# Patient Record
Sex: FEMALE | Race: WHITE
[De-identification: names, ages, dates, MRNs, and addresses within clinical notes are randomized per-mention and may not be internally consistent; named-entity substitution may affect disease eponyms.]

---

## 2018-09-20 ENCOUNTER — HOSPITAL ENCOUNTER (EMERGENCY)
Dept: HOSPITAL 47 - EC | Age: 23
Discharge: HOME | End: 2018-09-20
Payer: COMMERCIAL

## 2018-09-20 VITALS
RESPIRATION RATE: 18 BRPM | TEMPERATURE: 98.5 F | SYSTOLIC BLOOD PRESSURE: 109 MMHG | HEART RATE: 89 BPM | DIASTOLIC BLOOD PRESSURE: 72 MMHG
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
## 2018-09-20 DIAGNOSIS — S80.01XA: Primary | ICD-10-CM
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
## 2018-09-20 DIAGNOSIS — Z23: ICD-10-CM

## 2018-09-20 PROCEDURE — 90471 IMMUNIZATION ADMIN: CPT

## 2018-09-20 PROCEDURE — 99283 EMERGENCY DEPT VISIT LOW MDM: CPT

## 2018-09-20 PROCEDURE — 90715 TDAP VACCINE 7 YRS/> IM: CPT
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
## 2019-10-16 ENCOUNTER — HOSPITAL ENCOUNTER (INPATIENT)
Dept: HOSPITAL 47 - 4FBP | Age: 24
LOS: 3 days | Discharge: HOME | End: 2019-10-19
Attending: OBSTETRICS & GYNECOLOGY | Admitting: OBSTETRICS & GYNECOLOGY
Payer: COMMERCIAL

## 2019-10-16 VITALS — BODY MASS INDEX: 23.3 KG/M2

## 2019-10-16 DIAGNOSIS — Z3A.40: ICD-10-CM

## 2019-10-16 DIAGNOSIS — O36.5930: Primary | ICD-10-CM

## 2019-10-16 PROCEDURE — 85025 COMPLETE CBC W/AUTO DIFF WBC: CPT

## 2019-10-16 PROCEDURE — 86901 BLOOD TYPING SEROLOGIC RH(D): CPT

## 2019-10-16 PROCEDURE — 86850 RBC ANTIBODY SCREEN: CPT

## 2019-10-16 PROCEDURE — 86900 BLOOD TYPING SEROLOGIC ABO: CPT

## 2019-10-16 PROCEDURE — 88307 TISSUE EXAM BY PATHOLOGIST: CPT

## 2019-10-17 LAB
BASOPHILS # BLD AUTO: 0.1 K/UL (ref 0–0.2)
BASOPHILS NFR BLD AUTO: 0 %
EOSINOPHIL # BLD AUTO: 0.2 K/UL (ref 0–0.7)
EOSINOPHIL NFR BLD AUTO: 2 %
ERYTHROCYTE [DISTWIDTH] IN BLOOD BY AUTOMATED COUNT: 3.55 M/UL (ref 3.8–5.4)
ERYTHROCYTE [DISTWIDTH] IN BLOOD: 12.5 % (ref 11.5–15.5)
HCT VFR BLD AUTO: 37.5 % (ref 34–46)
HGB BLD-MCNC: 12.6 GM/DL (ref 11.4–16)
LYMPHOCYTES # SPEC AUTO: 1.9 K/UL (ref 1–4.8)
LYMPHOCYTES NFR SPEC AUTO: 15 %
MCH RBC QN AUTO: 35.6 PG (ref 25–35)
MCHC RBC AUTO-ENTMCNC: 33.7 G/DL (ref 31–37)
MCV RBC AUTO: 105.6 FL (ref 80–100)
MONOCYTES # BLD AUTO: 0.5 K/UL (ref 0–1)
MONOCYTES NFR BLD AUTO: 4 %
NEUTROPHILS # BLD AUTO: 9.5 K/UL (ref 1.3–7.7)
NEUTROPHILS NFR BLD AUTO: 77 %
PLATELET # BLD AUTO: 306 K/UL (ref 150–450)
WBC # BLD AUTO: 12.3 K/UL (ref 3.8–10.6)

## 2019-10-17 PROCEDURE — 3E0P7VZ INTRODUCTION OF HORMONE INTO FEMALE REPRODUCTIVE, VIA NATURAL OR ARTIFICIAL OPENING: ICD-10-PCS

## 2019-10-17 PROCEDURE — 00HU33Z INSERTION OF INFUSION DEVICE INTO SPINAL CANAL, PERCUTANEOUS APPROACH: ICD-10-PCS

## 2019-10-17 PROCEDURE — 3E0R3NZ INTRODUCTION OF ANALGESICS, HYPNOTICS, SEDATIVES INTO SPINAL CANAL, PERCUTANEOUS APPROACH: ICD-10-PCS

## 2019-10-17 PROCEDURE — 10907ZC DRAINAGE OF AMNIOTIC FLUID, THERAPEUTIC FROM PRODUCTS OF CONCEPTION, VIA NATURAL OR ARTIFICIAL OPENING: ICD-10-PCS

## 2019-10-17 RX ADMIN — POTASSIUM CHLORIDE SCH: 14.9 INJECTION, SOLUTION INTRAVENOUS at 14:59
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
## 2019-10-18 PROCEDURE — 0KQM0ZZ REPAIR PERINEUM MUSCLE, OPEN APPROACH: ICD-10-PCS

## 2019-10-18 RX ADMIN — IBUPROFEN PRN MG: 600 TABLET ORAL at 09:33

## 2019-10-18 RX ADMIN — IBUPROFEN PRN MG: 600 TABLET ORAL at 15:16

## 2019-10-18 RX ADMIN — DOCUSATE SODIUM AND SENNOSIDES SCH: 50; 8.6 TABLET ORAL at 21:06

## 2019-10-18 RX ADMIN — DOCUSATE SODIUM AND SENNOSIDES SCH EACH: 50; 8.6 TABLET ORAL at 21:04

## 2019-10-18 RX ADMIN — POTASSIUM CHLORIDE SCH MLS/HR: 14.9 INJECTION, SOLUTION INTRAVENOUS at 03:16

## 2019-10-18 RX ADMIN — POTASSIUM CHLORIDE SCH MLS/HR: 14.9 INJECTION, SOLUTION INTRAVENOUS at 02:00

## 2019-10-18 RX ADMIN — IBUPROFEN PRN MG: 600 TABLET ORAL at 21:06

## 2019-10-18 RX ADMIN — DOCUSATE SODIUM AND SENNOSIDES SCH EACH: 50; 8.6 TABLET ORAL at 21:06

## 2019-10-18 NOTE — P.PROBDLV
Vaginal Delivery Note





- .


Vaginal Delivery Note: 





This is a pleasant 23-year-old  1 para 0 that presented to labor and 

delivery for Cervidil induction of labor at 40 1/7 weeks.  Cervidil was placed 

without difficulty about 1300 this afternoon.  Patient progressed through labor 

rohini regularly, eventually becoming uncomfortable and requesting epidural

placement.  Epidural was placed without difficulty by the anesthesia department.

 Patient had noted spontaneous rupture of membranes and thin meconium was noted.

 Patient progressed to complete began pushing and had a normal spontaneous 

vaginal delivery of a viable female infant at 505, weight of 6 pounds 1.5 

ounces, Apgars of 9 and 9 at one and 5 minutes respectively.  The umbilical cord

was then doubly clamped and cut after a two-minute delayed and the infant was 

handed off to mom.  The placenta was then delivered spontaneously intact with a 

three-vessel cord being noted.  On inspection the patient's vaginal vault a 

second-degree midline laceration was noted this was repaired in the usual 

fashion with 3-0 Rapide.  The uterus was noted to be firm and below the 

umbilicus at this time.  Estimated blood loss proximally 200 mL.


The vaginal laceration was inspected after delivery of the placenta hemostasis 

was appreciated no further lacerations were noted.





All counts were correct 2, patient and infant tolerated delivery well and are 

resting comfortably.

## 2019-10-19 VITALS — DIASTOLIC BLOOD PRESSURE: 69 MMHG | RESPIRATION RATE: 18 BRPM | HEART RATE: 87 BPM | SYSTOLIC BLOOD PRESSURE: 110 MMHG

## 2019-10-19 VITALS — TEMPERATURE: 98.4 F

## 2019-10-19 LAB
BASOPHILS # BLD AUTO: 0 K/UL (ref 0–0.2)
BASOPHILS NFR BLD AUTO: 0 %
EOSINOPHIL # BLD AUTO: 0.2 K/UL (ref 0–0.7)
EOSINOPHIL NFR BLD AUTO: 1 %
ERYTHROCYTE [DISTWIDTH] IN BLOOD BY AUTOMATED COUNT: 2.98 M/UL (ref 3.8–5.4)
ERYTHROCYTE [DISTWIDTH] IN BLOOD: 12.6 % (ref 11.5–15.5)
HCT VFR BLD AUTO: 32 % (ref 34–46)
HGB BLD-MCNC: 10.7 GM/DL (ref 11.4–16)
LYMPHOCYTES # SPEC AUTO: 2.1 K/UL (ref 1–4.8)
LYMPHOCYTES NFR SPEC AUTO: 15 %
MCH RBC QN AUTO: 36 PG (ref 25–35)
MCHC RBC AUTO-ENTMCNC: 33.5 G/DL (ref 31–37)
MCV RBC AUTO: 107.4 FL (ref 80–100)
MONOCYTES # BLD AUTO: 0.5 K/UL (ref 0–1)
MONOCYTES NFR BLD AUTO: 4 %
NEUTROPHILS # BLD AUTO: 10.5 K/UL (ref 1.3–7.7)
NEUTROPHILS NFR BLD AUTO: 78 %
PLATELET # BLD AUTO: 244 K/UL (ref 150–450)
WBC # BLD AUTO: 13.5 K/UL (ref 3.8–10.6)

## 2019-10-19 RX ADMIN — IBUPROFEN PRN MG: 600 TABLET ORAL at 06:10

## 2019-10-19 RX ADMIN — DOCUSATE SODIUM AND SENNOSIDES SCH EACH: 50; 8.6 TABLET ORAL at 08:06

## 2019-10-19 NOTE — P.DS
Providers


Date of admission: 


10/17/19 12:06





Expected date of discharge: 10/19/19


Attending physician: 


Saundra Copeland





Primary care physician: 


JAYA Kinney








- Discharge Diagnosis(es)


(1) Term pregnancy


Current Visit: Yes   Status: Acute   





(2) IUGR (intrauterine growth restriction)


Current Visit: Yes   Status: Acute   





(3) Status post vaginal delivery


Current Visit: Yes   Status: Acute   





(4) Perineal laceration during delivery


Current Visit: Yes   Status: Acute   


Hospital Course: 





This is a pleasant 23-year-old  1 para 0 that presented to labor and 

delivery on 10/17 for induction of labor.  Patient was noted to be 40-1/7 weeks.

 Patient was admitted and Cervidil induction of labor was begun.


Patient progressed through labor became uncomfortable and requested epidural 

placement.  Epidural was placed without difficulty by the anesthesia department.

 Patient noted spontaneous rupture of membranes soon afterwards and thin 

meconium was noted.  Patient progressed to complete began pushing and had a 

normal spontaneous vaginal delivery of a viable female infant at 505, weight of 

6 pounds 1.5 ounces and Apgars of 9 and 9 at one and 5 minutes respectively.  

Patient did sustain a second degree midline laceration which was repaired in the

usual fashion with 3-0 Rapide.  Patient's postpartum course has been uneventful.

 On this postpartum day #1 she is ambulating and voiding without difficulty.  

She is tolerating a regular diet without nausea or vomiting.  She denies 

concerns and wishes discharge home.


Patient Condition at Discharge: Good





Plan - Discharge Summary


Discharge Rx Participant: No


New Discharge Prescriptions: 


No Action


   Pnv No.95/Ferrous Fum/Folic AC [Prenatal Multivitamin Tablet] 1 each PO DAILY


Discharge Medication List





Pnv No.95/Ferrous Fum/Folic AC [Prenatal Multivitamin Tablet] 1 each PO DAILY 

10/17/19 [History]








Follow up Appointment(s)/Referral(s): 


Saundra Copeland DO [Doctor of Osteopathic Medicine] - 4 Weeks


Patient Instructions/Handouts:  Vaginal Delivery (DC), Vaginal Delivery (GEN)


Discharge Disposition: HOME SELF-CARE

## 2021-09-15 ENCOUNTER — HOSPITAL ENCOUNTER (OUTPATIENT)
Dept: HOSPITAL 47 - LABWHC1 | Age: 26
Discharge: HOME | End: 2021-09-15
Attending: OBSTETRICS & GYNECOLOGY
Payer: COMMERCIAL

## 2021-09-15 DIAGNOSIS — O20.0: Primary | ICD-10-CM

## 2021-09-15 DIAGNOSIS — Z3A.00: ICD-10-CM

## 2021-09-15 PROCEDURE — 36415 COLL VENOUS BLD VENIPUNCTURE: CPT

## 2021-09-15 PROCEDURE — 84144 ASSAY OF PROGESTERONE: CPT

## 2021-09-15 PROCEDURE — 84702 CHORIONIC GONADOTROPIN TEST: CPT

## 2021-09-17 ENCOUNTER — HOSPITAL ENCOUNTER (OUTPATIENT)
Dept: HOSPITAL 47 - LABWHC1 | Age: 26
Discharge: HOME | End: 2021-09-17
Attending: OBSTETRICS & GYNECOLOGY
Payer: COMMERCIAL

## 2021-09-17 DIAGNOSIS — O20.0: Primary | ICD-10-CM

## 2021-09-17 PROCEDURE — 84702 CHORIONIC GONADOTROPIN TEST: CPT

## 2021-09-17 PROCEDURE — 36415 COLL VENOUS BLD VENIPUNCTURE: CPT

## 2023-01-27 ENCOUNTER — HOSPITAL ENCOUNTER (INPATIENT)
Dept: HOSPITAL 47 - 4FBP | Age: 28
LOS: 1 days | Discharge: HOME | End: 2023-01-28
Attending: OBSTETRICS & GYNECOLOGY | Admitting: OBSTETRICS & GYNECOLOGY
Payer: COMMERCIAL

## 2023-01-27 DIAGNOSIS — Z3A.40: ICD-10-CM

## 2023-01-27 DIAGNOSIS — O48.0: ICD-10-CM

## 2023-01-27 LAB
BASOPHILS # BLD AUTO: 0 K/UL (ref 0–0.2)
BASOPHILS NFR BLD AUTO: 0 %
EOSINOPHIL # BLD AUTO: 0.2 K/UL (ref 0–0.7)
EOSINOPHIL NFR BLD AUTO: 2 %
ERYTHROCYTE [DISTWIDTH] IN BLOOD BY AUTOMATED COUNT: 3.61 M/UL (ref 3.8–5.4)
ERYTHROCYTE [DISTWIDTH] IN BLOOD: 14 % (ref 11.5–15.5)
HCT VFR BLD AUTO: 33.5 % (ref 34–46)
HGB BLD-MCNC: 10.4 GM/DL (ref 11.4–16)
LYMPHOCYTES # SPEC AUTO: 1.8 K/UL (ref 1–4.8)
LYMPHOCYTES NFR SPEC AUTO: 17 %
MCH RBC QN AUTO: 28.9 PG (ref 25–35)
MCHC RBC AUTO-ENTMCNC: 31.2 G/DL (ref 31–37)
MCV RBC AUTO: 92.6 FL (ref 80–100)
MONOCYTES # BLD AUTO: 0.4 K/UL (ref 0–1)
MONOCYTES NFR BLD AUTO: 4 %
NEUTROPHILS # BLD AUTO: 8.2 K/UL (ref 1.3–7.7)
NEUTROPHILS NFR BLD AUTO: 76 %
PLATELET # BLD AUTO: 269 K/UL (ref 150–450)
WBC # BLD AUTO: 10.8 K/UL (ref 3.8–10.6)

## 2023-01-27 PROCEDURE — 0DQR0ZZ REPAIR ANAL SPHINCTER, OPEN APPROACH: ICD-10-PCS

## 2023-01-27 PROCEDURE — 85025 COMPLETE CBC W/AUTO DIFF WBC: CPT

## 2023-01-27 PROCEDURE — 3E033VJ INTRODUCTION OF OTHER HORMONE INTO PERIPHERAL VEIN, PERCUTANEOUS APPROACH: ICD-10-PCS

## 2023-01-27 PROCEDURE — 0KQM0ZZ REPAIR PERINEUM MUSCLE, OPEN APPROACH: ICD-10-PCS

## 2023-01-27 PROCEDURE — 86901 BLOOD TYPING SEROLOGIC RH(D): CPT

## 2023-01-27 PROCEDURE — 86850 RBC ANTIBODY SCREEN: CPT

## 2023-01-27 PROCEDURE — 86900 BLOOD TYPING SEROLOGIC ABO: CPT

## 2023-01-27 PROCEDURE — 4A0HXCZ MEASUREMENT OF PRODUCTS OF CONCEPTION, CARDIAC RATE, EXTERNAL APPROACH: ICD-10-PCS

## 2023-01-27 PROCEDURE — 10907ZC DRAINAGE OF AMNIOTIC FLUID, THERAPEUTIC FROM PRODUCTS OF CONCEPTION, VIA NATURAL OR ARTIFICIAL OPENING: ICD-10-PCS

## 2023-01-27 RX ADMIN — DOCUSATE SODIUM AND SENNOSIDES SCH EACH: 50; 8.6 TABLET ORAL at 23:35

## 2023-01-27 RX ADMIN — IBUPROFEN PRN MG: 600 TABLET ORAL at 19:41

## 2023-01-27 RX ADMIN — POTASSIUM CHLORIDE SCH MLS/HR: 14.9 INJECTION, SOLUTION INTRAVENOUS at 06:38

## 2023-01-27 RX ADMIN — POTASSIUM CHLORIDE SCH MLS/HR: 14.9 INJECTION, SOLUTION INTRAVENOUS at 11:58

## 2023-01-27 RX ADMIN — Medication SCH: at 11:57

## 2023-01-27 RX ADMIN — ACETAMINOPHEN PRN MG: 325 TABLET, FILM COATED ORAL at 23:35

## 2023-01-27 NOTE — P.PROBDLV
Vaginal Delivery Note





- .


Vaginal Delivery Note: 








findings viable male infant delivered at 1709, weight of 7 lbs. 15 oz., Apgars 

of 9 and 9 at one and 5 minutes respectively.








this is a 27-year-old  011 that presented to labor and delivery at 40 1/7 

weeks for induction of labor secondary to postdates.  Patient was admitted to 

labor and delivery and Pitocin induction of labor was begun.  Patient underwent 

amniotomy and clear fluid was obtained.  Patient progressed slowly through labor

eventually becoming uncomfortable and requesting epidural placement.  Epidural 

was placed when she was 4 cm.  Patient progressed through labor eventually noted

to be completely dilated.  Patient began pushing and infant was suspected to be 

in a occiput posterior presentation.  Multiple position changes were initiated. 

Fetal heart tones returned be category 2 throughout.  Patient brought the infant

down to a  presentation was placed in a modified lithotomy position.  

With excellent maternal effort the fetal head followed by the anterior/posterior

shoulder were delivered the infant was then placed on the maternal abdomen.  

Spontaneous cry was noted at birth.  After two-minute delayed the umbo cord was 

doubly clamped and cut.  The pledget was delivered spontaneously intact with a 

three-vessel cord being noted during pushing the IV did fall out, I am Pitocin 

was then given.  Bleeding was noted to be brisk therefore Methergine was given 

in addition.  The uterus was noted to be slightly boggy and a small amount clots

were manually extracted from the uterine cavity.  The bladder bladder was 

drained for approximately 200 mL of clear yellow urine.


On inspection the patient's vaginal vault a third degree vaginal laceration was 

appreciated.  This was injected with lidocaine and repaired in the usual fashion

with 3-0 Rapide.  Following closure a rectal exam was performed and found to be 

intact with no defects.  The laceration was noted to be hemostatic on further 

inspection.  Uterus was noted to be firm and below the umbilicus.  All counts 

were noted to be correct 2 at the end delivery.
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
## 2023-01-28 VITALS — SYSTOLIC BLOOD PRESSURE: 112 MMHG | TEMPERATURE: 97.8 F | HEART RATE: 90 BPM | DIASTOLIC BLOOD PRESSURE: 70 MMHG

## 2023-01-28 VITALS — RESPIRATION RATE: 18 BRPM

## 2023-01-28 LAB
BASOPHILS # BLD AUTO: 0 K/UL (ref 0–0.2)
BASOPHILS NFR BLD AUTO: 0 %
EOSINOPHIL # BLD AUTO: 0.1 K/UL (ref 0–0.7)
EOSINOPHIL NFR BLD AUTO: 0 %
ERYTHROCYTE [DISTWIDTH] IN BLOOD BY AUTOMATED COUNT: 3.32 M/UL (ref 3.8–5.4)
ERYTHROCYTE [DISTWIDTH] IN BLOOD: 13.5 % (ref 11.5–15.5)
HCT VFR BLD AUTO: 29.8 % (ref 34–46)
HGB BLD-MCNC: 9.7 GM/DL (ref 11.4–16)
LYMPHOCYTES # SPEC AUTO: 2 K/UL (ref 1–4.8)
LYMPHOCYTES NFR SPEC AUTO: 10 %
MCH RBC QN AUTO: 29.1 PG (ref 25–35)
MCHC RBC AUTO-ENTMCNC: 32.5 G/DL (ref 31–37)
MCV RBC AUTO: 89.6 FL (ref 80–100)
MONOCYTES # BLD AUTO: 0.6 K/UL (ref 0–1)
MONOCYTES NFR BLD AUTO: 3 %
NEUTROPHILS # BLD AUTO: 16.6 K/UL (ref 1.3–7.7)
NEUTROPHILS NFR BLD AUTO: 85 %
PLATELET # BLD AUTO: 270 K/UL (ref 150–450)
WBC # BLD AUTO: 19.6 K/UL (ref 3.8–10.6)

## 2023-01-28 RX ADMIN — ACETAMINOPHEN PRN MG: 325 TABLET, FILM COATED ORAL at 15:28

## 2023-01-28 RX ADMIN — IBUPROFEN PRN MG: 600 TABLET ORAL at 12:47

## 2023-01-28 RX ADMIN — ACETAMINOPHEN PRN MG: 325 TABLET, FILM COATED ORAL at 09:04

## 2023-01-28 RX ADMIN — Medication SCH EACH: at 09:05

## 2023-01-28 RX ADMIN — DOCUSATE SODIUM AND SENNOSIDES SCH EACH: 50; 8.6 TABLET ORAL at 09:05

## 2023-01-28 RX ADMIN — IBUPROFEN PRN MG: 600 TABLET ORAL at 02:21

## 2023-01-28 NOTE — P.DS
Providers


Date of admission: 


23 05:55





Expected date of discharge: 23


Attending physician: 


Saundra Copeland





Primary care physician: 


Stated None








- Discharge Diagnosis(es)


(1) Status post vaginal delivery


Current Visit: No   Status: Acute   


Hospital Course: 





the patient is a 27-year-old  3 para 1011 admitted at 40-3/7 weeks by 

good dating parameters.  She is admitted for postdates induction with all signs 

reassuring.  Her pregnancy has been uncomplicated and group B strep status is 

negative.  On labor and delivery, she had Pitocin augmentation started and 

underwent artificial rupture of membranes for clear fluid.  she had an epidural 

catheter placed around the onset of the active phase of labor and progressed to 

complete.  She required some position changes but ultimately pushed to a normal 

spontaneous vaginal delivery of a viable 7 lbs. 15 oz. baby boy with Apgars of 9

at 1 minute and 9 at 5 minutes.  She was found with a third-degree perineal 

laceration was repaired in standard fashion.  Her postpartum course was 

unremarkable with vital signs being stable and her temperature was afebrile 

throughout.  She is deemed stable for discharge on postpartum day #2 was 

discharged home to follow-up in the office in 6 weeks' time routinely.  

Discharge instructions included calling for any significantly increased bleeding

or foul-smelling lochia, significantly increased fever abdominal pain, perineal 

complaints, breast complaints, or anything else that concerned her.  She is 

additionally instructed to have nothing in the vagina for at least 6 weeks time 

to include intercourse.  She understood her instructions and agrees to follow up

as noted above.  Discharge medications included continued prenatal vitamins as 

she has opted to breast-feed as well as over-the-counter analgesic pain 

medications.  Maternal blood type is O+ and rubella status is immune.


Procedures: 





#1.  Pitocin induction #2.  Artificial rupture of membranes #3.  Epidural 

analgesia #4.  Normal spontaneous vaginal delivery #5.  Repair of third degree 

perineal laceration


Patient Condition at Discharge: Stable





Plan - Discharge Summary


Discharge Rx Participant: Yes


New Discharge Prescriptions: 


No Action


   Pnv No.95/Ferrous Fum/Folic AC [Prenatal Multivitamin Tablet] 1 each PO DAILY


Discharge Medication List





Pnv No.95/Ferrous Fum/Folic AC [Prenatal Multivitamin Tablet] 1 each PO DAILY 

10/17/19 [History]








Follow up Appointment(s)/Referral(s): 


Saundra Copeland DO [Doctor of Osteopathic Medicine] - 6 Weeks


Discharge Disposition: HOME SELF-CARE